# Patient Record
(demographics unavailable — no encounter records)

---

## 2024-10-15 NOTE — PROCEDURE
[FreeTextEntry1] : left great saphenous vein ablation (radiofrequency) [FreeTextEntry2] : symptomatic reflux [FreeTextEntry3] :  Review of medical records, including venous ultrasound, noting correct procedure including site and side. The provider verifies presence and review of imaging studies and/or written report of imaging studies. Agreement on the procedure to be performed.  Time out completed. All of the above has been confirmed by the team. All patient-specific concerns have been addressed.  Indication:  Symptomatic venous insufficiency/ reflux. Procedure: radiofrequency ablation of the LEFT great saphenous vein.   The patient was escorted into the procedure room and a time out called.  The entire limb was prepped and draped in sterile fashion. The RF fiber was placed on the sterile field and connected by a sterile cable. Actuation, temperature, and impedance testing were performed to ensure that all components were connected and operating properly. The patient was placed on the procedure table and local anesthesia was instilled in the skin overlying the access site. Under ultrasound guidance, the vein was punctured with a micropuncture needle, using the anterior wall technique. A guide wire was now introduced through the needle, and the needle was then exchanged over the guide wire for a 7F sheath. The guide wire was removed, and the RF probe was then placed into the left great saphenous vein through the sheath and position confirmed using ultrasound guidance. The probe was greater than 3.5 cm from the SFJ. After the RF probe position was verified by ultrasound, tumescent anesthesia consisting of normal saline, 1% lidocaine with 8.4% sodium bicarbonate was infiltrated, under ultrasound guidance, precisely into the perivenous compartment along the entire length of the vein until a halo of fluid was noted around the vein. After RF probe position was again confirmed with ultrasound imaging, RF energy was applied. The probe was gradually and carefully withdrawn at a rate of 6.5cm/20seconds.  10 cycles of RF performed Total RFA treatment time was 3.2 min Treatment length was 48cm   Estimated Blood Loss: minimal   Repeat ultrasound of the treated vein was performed confirming successful treatment. The catheter and sheath were withdrawn, and hemostasis established with direct pressure. After assuring hemostasis, a sterile 4x4 was placed on the access site and an ACE compression wrap was applied. Patient tolerated procedure well. Patient was given post-procedure instructions and follow up appointment was scheduled.

## 2025-05-04 NOTE — ASSESSMENT
[TextEntry] : Mr. AMANDA VILLEGASCARY is a 83 year old here for telangiectasis of anterior chest wall

## 2025-05-04 NOTE — REASON FOR VISIT
[Procedure: _________] : a [unfilled] procedure visit [FreeTextEntry1] : anterior chest wall cosmetic sclerotherapy

## 2025-05-04 NOTE — PROCEDURE
[FreeTextEntry1] : anterior chest wall cosmetic sclerotherapy [FreeTextEntry2] : telangectasis [FreeTextEntry3] : Indication: telangiectasis of chest wall   Procedure: bilateral lower extremity sclerotherapy.  Assistant: Little Squires PA-C   Procedure Note:  Mr. AMANDA VILLA is a 83 year old M with telangiectasis of chest wall.   I have discussed the risks of the procedure with the patient. Detailed discussion was held with the patient. Risks of itching, superficial thrombophlebitis, hyperpigmentation, allergic reactions, skin irritation due to extravasation of the sclerosant, air-embolism, and in rare cases deep vein thrombosis were all discussed with the patient. The patient agrees to the procedure. The patient was escorted into the procedure room, placed on the procedure table.   2.0 ml of 1.0% Asclera was mixed with 2 ml of normal saline and air. The vein was cannulated with a 30G needle. The solution injected and appropriate visualization of the foam going into the vein was achieved using direct visualization. This was repeated at multiple different sites.  Every injected area was immediately compressed with gauze.      Estimated Blood Loss: minimal   Medication: Asclera 1%

## 2025-05-04 NOTE — PLAN
[TextEntry] : Ace wrap for 48 hours Resume regular activities of daily living.  Walking is recommended and encouraged. Keep hydrated Avoid vigorous activity for 2 weeks. Follow up appointment within 2-3 weeks